# Patient Record
Sex: MALE | URBAN - METROPOLITAN AREA
[De-identification: names, ages, dates, MRNs, and addresses within clinical notes are randomized per-mention and may not be internally consistent; named-entity substitution may affect disease eponyms.]

---

## 2018-05-24 ENCOUNTER — APPOINTMENT (RX ONLY)
Dept: URBAN - METROPOLITAN AREA CLINIC 45 | Facility: CLINIC | Age: 23
Setting detail: DERMATOLOGY
End: 2018-05-24

## 2018-05-24 DIAGNOSIS — L40.8 OTHER PSORIASIS: ICD-10-CM

## 2018-05-24 DIAGNOSIS — L85.3 XEROSIS CUTIS: ICD-10-CM

## 2018-05-24 PROCEDURE — 99203 OFFICE O/P NEW LOW 30 MIN: CPT

## 2018-05-24 PROCEDURE — ? PRESCRIPTION

## 2018-05-24 PROCEDURE — ? COUNSELING

## 2018-05-24 PROCEDURE — ? TREATMENT REGIMEN

## 2018-05-24 RX ORDER — HYDROCORTISONE ACETATE, IODOQUINOL 19; 10 MG/G; MG/G
CREAM TOPICAL
Qty: 30 | Refills: 4 | Status: ERX | COMMUNITY
Start: 2018-05-24

## 2018-05-24 RX ORDER — PIMECROLIMUS 10 MG/G
CREAM TOPICAL
Qty: 1 | Refills: 3 | Status: ERX | COMMUNITY
Start: 2018-05-24

## 2018-05-24 RX ORDER — CLOBETASOL PROPIONATE 0.5 MG/G
AEROSOL, FOAM TOPICAL
Qty: 1 | Refills: 3 | Status: ERX | COMMUNITY
Start: 2018-05-24

## 2018-05-24 RX ADMIN — PIMECROLIMUS: 10 CREAM TOPICAL at 13:17

## 2018-05-24 RX ADMIN — CLOBETASOL PROPIONATE: 0.5 AEROSOL, FOAM TOPICAL at 13:06

## 2018-05-24 RX ADMIN — HYDROCORTISONE ACETATE, IODOQUINOL: 19; 10 CREAM TOPICAL at 13:15

## 2018-05-24 ASSESSMENT — LOCATION SIMPLE DESCRIPTION DERM: LOCATION SIMPLE: ABDOMEN

## 2018-05-24 ASSESSMENT — LOCATION DETAILED DESCRIPTION DERM: LOCATION DETAILED: UMBILICUS

## 2018-05-24 ASSESSMENT — LOCATION ZONE DERM: LOCATION ZONE: TRUNK

## 2018-05-24 NOTE — PROCEDURE: TREATMENT REGIMEN
Action 2: Continue
Other Instructions: Location: umbilical and groin region\\nPrescribe: Olux E foam top qd x 30 days\\n                 Vytone top qd x 30 days\\n                 Elidel top qd x 30 days\\n\\nPatient presents dry/flaky skin on umbilical and groin region that has come and go for many years.\\nThere is pitting present on right finger nails.\\nEducated patient about psoriasis and explained in great detail that this is caused by his immune system attacking the skin/becoming angry, during times of stress or illness.\\nDiscussed with patient that there will be times that he feels as if his skin is completely clear, and that there will be times where his psoriasis is very active.\\n\\nWill prescribe Olux E foam and Vytone cream top qd x 30 days to help with inflammation.\\nDiscussed with pt to use Olux-E foam for 1 week, then afterwards, start using Vytone cream.\\n\\nDiscussed with pt that we will also prescribe Elidel since it is non-steroidal and can use daily when not as intense.\\n\\nEducated patient about how to use medications that we will use to treat this condition, and educated patient on the important of maintaining a healthy skin barrier by using Cerave Moisturizing Cream/Healing Ointment daily, and only lathering soap under the arms and in private areas.\\nF/u as needed
Detail Level: Zone

## 2018-06-20 ENCOUNTER — APPOINTMENT (RX ONLY)
Dept: URBAN - METROPOLITAN AREA CLINIC 45 | Facility: CLINIC | Age: 23
Setting detail: DERMATOLOGY
End: 2018-06-20

## 2018-06-20 DIAGNOSIS — L85.3 XEROSIS CUTIS: ICD-10-CM

## 2018-06-20 DIAGNOSIS — L40.8 OTHER PSORIASIS: ICD-10-CM

## 2018-06-20 PROCEDURE — ? PRESCRIPTION

## 2018-06-20 PROCEDURE — ? COUNSELING

## 2018-06-20 PROCEDURE — ? TREATMENT REGIMEN

## 2018-06-20 PROCEDURE — 99213 OFFICE O/P EST LOW 20 MIN: CPT

## 2018-06-20 RX ORDER — TACROLIMUS 1 MG/G
OINTMENT TOPICAL
Qty: 1 | Refills: 5 | Status: ERX | COMMUNITY
Start: 2018-06-20

## 2018-06-20 RX ADMIN — TACROLIMUS: 1 OINTMENT TOPICAL at 13:04

## 2018-06-20 ASSESSMENT — LOCATION DETAILED DESCRIPTION DERM: LOCATION DETAILED: UMBILICUS

## 2018-06-20 ASSESSMENT — LOCATION SIMPLE DESCRIPTION DERM: LOCATION SIMPLE: ABDOMEN

## 2018-06-20 ASSESSMENT — LOCATION ZONE DERM: LOCATION ZONE: TRUNK

## 2018-06-20 NOTE — PROCEDURE: TREATMENT REGIMEN
Action 1: Continue
Other Instructions: Location: Umbilical and Groin Area\\nPrescribe:  Continue Olux E foam top qd for only a few days if he has a severe flare up \\n                   Continue Vytone top qd x 30 days or PRN\\n                   Continue Elidel top qd x 30 days or PRN\\n\\nPatient is here for follow up and has shown good results from using olux, vytone and elidel.\\nPatient states that his skin was under control after just a few days of using the samples we gave him, and is very happy with the results.\\nPatient is under a lot of stress with work, discussed with patient how to manage this condition during times of illness and stress. \\nPatient presented dry/flaky skin on umbilical and groin region that has come and go for many years.\\nThere was pitting present on right finger nails.\\nEducated patient about psoriasis and explained in great detail that this is caused by his immune system attacking the skin/becoming angry, during times of stress or illness.\\nDiscussed with patient that there will be times that he feels as if his skin is completely clear, and that there will be times where his psoriasis is very active.\\n\\nWill continue Olux E foam and Vytone cream top qd x 30 days to help with inflammation.\\nDiscussed with pt to use Olux-E foam for 1 week, then afterwards, start using Vytone cream.\\n\\nDiscussed with pt that we will also continue Elidel since it is non-steroidal and can use daily when not as intense.\\n\\nEducated patient about how to use medications that we will use to treat this condition, and educated patient on the important of maintaining a healthy skin barrier by using Cerave Moisturizing Cream/Healing Ointment daily, and only lathering soap under the arms and in private areas.\\nF/u as needed
Detail Level: Zone